# Patient Record
Sex: MALE | Race: WHITE
[De-identification: names, ages, dates, MRNs, and addresses within clinical notes are randomized per-mention and may not be internally consistent; named-entity substitution may affect disease eponyms.]

---

## 2017-05-26 ENCOUNTER — HOSPITAL ENCOUNTER (EMERGENCY)
Dept: HOSPITAL 42 - ED | Age: 22
Discharge: HOME | End: 2017-05-26
Payer: MEDICAID

## 2017-05-26 VITALS — BODY MASS INDEX: 25.3 KG/M2

## 2017-05-26 VITALS
DIASTOLIC BLOOD PRESSURE: 78 MMHG | RESPIRATION RATE: 15 BRPM | OXYGEN SATURATION: 99 % | HEART RATE: 82 BPM | TEMPERATURE: 98.6 F | SYSTOLIC BLOOD PRESSURE: 126 MMHG

## 2017-05-26 DIAGNOSIS — H60.92: Primary | ICD-10-CM

## 2017-05-26 NOTE — ED PDOC
Arrival/HPI





- General


Chief Complaint: ENT Problem


Time Seen by Provider: 05/26/17 12:30


Historian: Patient





- History of Present Illness


Narrative History of Present Illness (Text): 





05/26/17 12:47


This 22 yo male presents to this ED c/o left ear pain x 2 days.  Pain is worsen 

if ear lobe is pulled.  Denies facial or scalp rash.  Denies ear trauma, or 

discharge.  


Time/Duration: Other (2 days)


Symptom Onset: Sudden


Symptom Course: Worsening


Context: Home





Past Medical History





- Provider Review


Nursing Documentation Reviewed: Yes





- Past History


Past History: No Previous





- Infectious Disease


Hx of Infectious Diseases: None





- Tetanus Immunization


Tetanus Immunization: Unknown, Up to Date





- Past Medical History


Past Medical History: No Previous





- Psychiatric


Hx Substance Use: No





- Past Surgical History


Past Surgical History: No Previous





Family/Social History





- Physician Review


Nursing Documentation Reviewed: Yes


Family/Social History: No Known Family HX


Smoking Status: Never Smoked


Hx Alcohol Use: No


Hx Substance Use: No





Allergies/Home Meds


Allergies/Adverse Reactions: 


Allergies





No Known Allergies Allergy (Verified 05/26/17 12:08)


 











Review of Systems





- Review of Systems


Constitutional: Normal.  absent: Fatigue, Weight Change, Fevers


Eyes: Normal


ENT: Other (left ear pain)


Respiratory: Normal


Cardiovascular: Normal


Gastrointestinal: Normal


Genitourinary Male: Normal


Musculoskeletal: Normal


Skin: Normal


Neurological: Normal


Endocrine: Normal


Hemo/Lymphatic: Normal


Psychiatric: Normal





Physical Exam





Vital Signs











  Temp Pulse Resp BP Pulse Ox


 


 05/26/17 12:08  98.6 F  82  15  126/78  99











Temperature: Afebrile


Blood Pressure: Normal


Pulse: Regular


Respiratory Rate: Normal


Appearance: Positive for: Well-Appearing, Non-Toxic, Comfortable


Pain Distress: None


Mental Status: Positive for: Alert and Oriented X 3





- Systems Exam


Head: Present: Atraumatic, Normocephalic


Pupils: Present: PERRL


Extroacular Muscles: Present: EOMI


Conjunctiva: Present: Normal


Ears: Present: Erythema, Other (right ear is normal).  No: Normal Canal, TM 

Bulging, Fluid, TM Perf


Mouth: Present: Moist Mucous Membranes


Pharnyx: Present: Normal.  No: ERYTHEMA, TONSILS ENLARGED


Neck: Present: Normal Range of Motion


Respiratory/Chest: Present: Clear to Auscultation, Good Air Exchange.  No: 

Respiratory Distress, Accessory Muscle Use


Cardiovascular: Present: Regular Rate and Rhythm, Normal S1, S2.  No: Murmurs


Back: Present: Normal Inspection


Upper Extremity: Present: Normal Inspection, Normal ROM, Capillary Refill < 2s.

  No: Cyanosis, Edema


Lower Extremity: Present: Normal Inspection, NORMAL PULSES, Normal ROM.  No: 

Edema


Neurological: Present: GCS=15, CN II-XII Intact, Speech Normal, Motor Func 

Grossly Intact, Normal Sensory Function, Normal Cerebellar Funct, Gait Normal


Skin: Present: Warm, Dry, Normal Color.  No: Rashes


Psychiatric: Present: Alert, Oriented x 3, Normal Insight, Normal Concentration





Medical Decision Making


ED Course and Treatment: 





05/26/17 12:50


Re-evaluation.  Patient feels better.  Discussed results and plan with patient 

who expresses understanding.  All questions answered and there is agreement 

with the plan to discharge home with instructions.  Patient stable for 

discharge.  Return if symptoms persist or worsen.


Re-evaluation Time: 12:50


Reassessment Condition: Re-examined, Improved





- Medication Orders


Current Medication Orders: 














Discontinued Medications





Amoxicillin (Amoxil 500 Mg Cap)  500 mg PO STAT STA


   PRN Reason: Protocol


   Stop: 05/26/17 12:42


Neomycin/Polymyxin/Hydrocortisone (Cortisporin Otic Susp)  1 ml AS STAT STA


   Stop: 05/26/17 12:42











- Procedure


PROCEDURE NOTE (Text): 





05/26/17 12:51


Left ear canal was irrigated with luke water.





Disposition/Present on Arrival





- Present on Arrival


Any Indicators Present on Arrival: No


History of DVT/PE: No


History of Uncontrolled Diabetes: No


Urinary Catheter: No


History of Decub. Ulcer: No


History Surgical Site Infection Following: None





- Disposition


Have Diagnosis and Disposition been Completed?: Yes


Diagnosis: 


 Otitis externa





Disposition: HOME/ ROUTINE


Disposition Time: 12:51


Patient Plan: Discharge


Condition: GOOD


Discharge Instructions (ExitCare):  Otitis Externa (ED)


Additional Instructions: 


Call private doctor for follow up visit in 1-2 days.   Take medication as 

instructed.   Return to emergency if symptoms worsen.  Call ENT doctor for 

revaluation.





Prescriptions: 


Amoxicillin [Amoxil 500 mg Cap] 500 mg PO TID #30 cap


Neomycin/Polymyxin/Hydrocort [Cortisporin Otic Soln] 4 drop AS QID #1 bottle


Referrals: 


Minh Dickson DO [Staff Provider] - Follow up with primary

## 2018-09-26 ENCOUNTER — HOSPITAL ENCOUNTER (EMERGENCY)
Dept: HOSPITAL 42 - ED | Age: 23
Discharge: HOME | End: 2018-09-26
Payer: COMMERCIAL

## 2018-09-26 VITALS — OXYGEN SATURATION: 100 % | TEMPERATURE: 98.1 F

## 2018-09-26 VITALS — SYSTOLIC BLOOD PRESSURE: 137 MMHG | DIASTOLIC BLOOD PRESSURE: 78 MMHG | HEART RATE: 80 BPM | RESPIRATION RATE: 18 BRPM

## 2018-09-26 VITALS — BODY MASS INDEX: 25.3 KG/M2

## 2018-09-26 DIAGNOSIS — S09.90XA: Primary | ICD-10-CM
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
## 2018-09-26 DIAGNOSIS — Z23: ICD-10-CM

## 2018-09-26 DIAGNOSIS — R05: ICD-10-CM
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
